# Patient Record
(demographics unavailable — no encounter records)

---

## 2017-04-28 NOTE — REP
MRI BRAIN WITHOUT CONTRAST:

 

HISTORY:  Skin tumor.

 

The patient declined contrast material.  There are no areas of abnormal signal

intensity in the brain.  There is no intraparenchymal hemorrhage, infarct, mass

or midline shift.  A 5 mm pineal cyst is present.  The ventricular system is

normal in appearance.  There is no extracerebral collection.  There is no

cerebellopontine angle mass.  The inner ear structures are normal in appearance.

The mastoid air cells are clear.  Mucosal thickening is present in the ethmoid

and maxillary sinuses.

 

IMPRESSION:

 

There is no intracranial lesion.

 

 

Signed by

Earnest Regalado MD 04/28/2017 11:25 A

## 2017-08-29 NOTE — REP
Cervical spine series:  Eight views:

 

History: M54.2, 54.5.  Neck pain.

 

Findings:  Lateral views done in flexion/extension and neutral position show some

limitation of flexion/extension range of motion.  Reversal of the normal cervical

lordosis is seen.  Cervical vertebral body heights are preserved.  Disc spaces

are maintained.  Alignment is normal.  No subluxation or instability is seen.

Prevertebral soft tissues are not widened.  Oblique images demonstrate intact

neural foramina bilaterally at each cervical level and normally aligned facets.

AP and open mouth odontoid views are unremarkable.

 

Impression:

 

Reversal of the normal cervical lordosis and limitation of flexion/extension

range of motion.  Otherwise normal cervical spine radiographs.

 

 

Signed by

Brenden Baca MD 08/29/2017 05:21 P

## 2017-08-29 NOTE — REP
LUMBOSACRAL SPINE SERIES:

 

Five views of the lumbosacral spine are performed. There is no compression

fracture or malalignment. There is normal lumbar lordosis. The disc spaces are

well preserved. Posterior elements are intact.

 

IMPRESSION:

 

Negative lumbosacral spine series.

 

 

Signed by

Slick Castro MD 08/29/2017 04:50 P

## 2017-08-29 NOTE — REP
SACRUM AND COCCYX:

 

Three views of the sacrum and coccyx are performed and demonstrate no fracture,

dislocation or intrinsic bone disease.

 

IMPRESSION:

 

No fracture or dislocation.

 

 

Signed by

Slick Castro MD 08/29/2017 04:50 P